# Patient Record
Sex: FEMALE | Race: BLACK OR AFRICAN AMERICAN | NOT HISPANIC OR LATINO | Employment: FULL TIME | URBAN - METROPOLITAN AREA
[De-identification: names, ages, dates, MRNs, and addresses within clinical notes are randomized per-mention and may not be internally consistent; named-entity substitution may affect disease eponyms.]

---

## 2018-02-19 ENCOUNTER — HOSPITAL ENCOUNTER (EMERGENCY)
Facility: HOSPITAL | Age: 33
Discharge: HOME | End: 2018-02-19
Attending: EMERGENCY MEDICINE
Payer: MEDICAID

## 2018-02-19 VITALS
HEIGHT: 65 IN | WEIGHT: 110 LBS | SYSTOLIC BLOOD PRESSURE: 128 MMHG | OXYGEN SATURATION: 100 % | HEART RATE: 94 BPM | TEMPERATURE: 99 F | BODY MASS INDEX: 18.33 KG/M2 | DIASTOLIC BLOOD PRESSURE: 69 MMHG | RESPIRATION RATE: 14 BRPM

## 2018-02-19 DIAGNOSIS — R11.10 VOMITING, INTRACTABILITY OF VOMITING NOT SPECIFIED, PRESENCE OF NAUSEA NOT SPECIFIED, UNSPECIFIED VOMITING TYPE: Primary | ICD-10-CM

## 2018-02-19 DIAGNOSIS — K29.00 ACUTE GASTRITIS, PRESENCE OF BLEEDING UNSPECIFIED, UNSPECIFIED GASTRITIS TYPE: ICD-10-CM

## 2018-02-19 DIAGNOSIS — J06.9 VIRAL UPPER RESPIRATORY INFECTION: ICD-10-CM

## 2018-02-19 PROCEDURE — 99283 EMERGENCY DEPT VISIT LOW MDM: CPT

## 2018-02-19 RX ORDER — ONDANSETRON 4 MG/1
4 TABLET, FILM COATED ORAL EVERY 6 HOURS
Qty: 12 TABLET | Refills: 0 | Status: SHIPPED | OUTPATIENT
Start: 2018-02-19 | End: 2019-05-06

## 2018-02-20 NOTE — ED TRIAGE NOTES
Pt presents to the ED c/o n/v. PCP dx with sinus infection on Friday and given prednisone, ibuprofen, doxycycline, zyrtec. Pt reports symptoms worsening. +weakness. Denies fever, chills, sore throat, body aches.

## 2018-02-20 NOTE — ED PROVIDER NOTES
Encounter Date: 2/19/2018    SCRIBE #1 NOTE: I, Monserrat Carcamo, am scribing for, and in the presence of,  Dr. Salazar. I have scribed the entire note.       History     Chief Complaint   Patient presents with    Nausea     Pt dx'ed with sinus infection Friday, says her symptoms of n/v and dizziness are getting worse. Emesis x 2 today.      Time patient was seen by the provider: 8:35 PM      The patient is a 32 y.o. female with no pertinent medical history who presents to the ED with a complaint of nausea. Patient reports her PCP diagnosed her with a sinus infection 3 days ago and prescribed her with steroids and antibiotics with no relief. She states her symptoms have worsened. Patient endorses cough, postnasal drip, nausea, vomiting, weakness, and dizziness. She denies alcohol and tobacco history.       The history is provided by the patient and medical records.     Review of patient's allergies indicates:  No Known Allergies  History reviewed. No pertinent past medical history.  Past Surgical History:   Procedure Laterality Date    CERVICAL BIOPSY  W/ LOOP ELECTRODE EXCISION      DILATION AND CURETTAGE OF UTERUS       History reviewed. No pertinent family history.  Social History   Substance Use Topics    Smoking status: Never Smoker    Smokeless tobacco: Not on file    Alcohol use No     Review of Systems   Constitutional: Negative for fever.   HENT: Positive for postnasal drip. Negative for sore throat.    Respiratory: Positive for cough. Negative for shortness of breath.    Cardiovascular: Negative for chest pain.   Gastrointestinal: Positive for nausea and vomiting.   Genitourinary: Negative for dysuria.   Musculoskeletal: Negative for back pain.   Skin: Negative for rash.   Neurological: Positive for dizziness and weakness.   Hematological: Does not bruise/bleed easily.       Physical Exam     Initial Vitals [02/19/18 2026]   BP Pulse Resp Temp SpO2   128/69 94 14 98.6 °F (37 °C) 100 %      MAP        88.67         Physical Exam    Nursing note and vitals reviewed.  Constitutional: She appears well-developed and well-nourished. No distress.   HENT:   Head: Normocephalic and atraumatic.   Right Ear: Tympanic membrane normal.   Eyes: Conjunctivae are normal.   Neck: Normal range of motion. Neck supple.   Cardiovascular: Normal rate, regular rhythm and normal heart sounds.   Pulmonary/Chest: Breath sounds normal. No respiratory distress.   Abdominal: Soft. Bowel sounds are normal. She exhibits no distension. There is no tenderness.   Musculoskeletal: Normal range of motion. She exhibits no edema.   Neurological: She is alert and oriented to person, place, and time. She has normal strength. No sensory deficit.   Skin: Skin is warm and dry. Capillary refill takes less than 2 seconds.   Psychiatric: She has a normal mood and affect.         ED Course   Procedures  Labs Reviewed - No data to display          Medical Decision Making:   History:   Old Medical Records: I decided to obtain old medical records.  ED Management:  Viral syndrome, flu. Vomiting due to doxy/gastritis. Suspect latter with absence of flu or other uri sx.             Scribe Attestation:   Scribe #1: I performed the above scribed service and the documentation accurately describes the services I performed. I attest to the accuracy of the note.    Attending Attestation:   Physician Attestation Statement for Resident:  As the supervising MD  -: I, Dr. Watson Salazar, personally performed the services described in this documentation. All medical record entries made by the scribe were at my direction and in my presence.  I have reviewed the chart and agree that the record reflects my personal performance and is accurate and complete. Watson Salazar MD.  8:49 PM 02/19/2018                         Clinical Impression:   The primary encounter diagnosis was Vomiting, intractability of vomiting not specified, presence of nausea not specified,  unspecified vomiting type. Diagnoses of Acute gastritis, presence of bleeding unspecified, unspecified gastritis type and Viral upper respiratory infection were also pertinent to this visit.                           Watson Salazar MD  02/19/18 2047

## 2019-05-06 ENCOUNTER — OFFICE VISIT (OUTPATIENT)
Dept: URGENT CARE | Facility: CLINIC | Age: 34
End: 2019-05-06
Payer: MEDICAID

## 2019-05-06 VITALS
OXYGEN SATURATION: 95 % | HEIGHT: 65 IN | DIASTOLIC BLOOD PRESSURE: 67 MMHG | RESPIRATION RATE: 16 BRPM | TEMPERATURE: 99 F | WEIGHT: 110 LBS | SYSTOLIC BLOOD PRESSURE: 98 MMHG | HEART RATE: 77 BPM | BODY MASS INDEX: 18.33 KG/M2

## 2019-05-06 DIAGNOSIS — J03.90 TONSILLITIS: Primary | ICD-10-CM

## 2019-05-06 DIAGNOSIS — J35.8 TONSIL STONE: ICD-10-CM

## 2019-05-06 PROCEDURE — 99203 OFFICE O/P NEW LOW 30 MIN: CPT | Mod: S$GLB,,, | Performed by: PHYSICIAN ASSISTANT

## 2019-05-06 PROCEDURE — 99203 PR OFFICE/OUTPT VISIT, NEW, LEVL III, 30-44 MIN: ICD-10-PCS | Mod: S$GLB,,, | Performed by: PHYSICIAN ASSISTANT

## 2019-05-06 RX ORDER — AMOXICILLIN 875 MG/1
875 TABLET, FILM COATED ORAL 2 TIMES DAILY
Qty: 20 TABLET | Refills: 0 | Status: SHIPPED | OUTPATIENT
Start: 2019-05-06 | End: 2019-05-16

## 2019-05-07 NOTE — PROGRESS NOTES
"Subjective:       Patient ID: Joaquín Encinas is a 34 y.o. female.    Vitals:  height is 5' 5" (1.651 m) and weight is 49.9 kg (110 lb). Her temperature is 98.6 °F (37 °C). Her blood pressure is 98/67 and her pulse is 77. Her respiration is 16 and oxygen saturation is 95%.     Chief Complaint: Sore Throat    Sore Throat    This is a recurrent problem. The current episode started 1 to 4 weeks ago. The problem has been gradually worsening. The pain is worse on the right side. There has been no fever. The pain is at a severity of 6/10. The pain is moderate. Pertinent negatives include no congestion, coughing, ear pain, shortness of breath, stridor or vomiting. She has had exposure to strep. She has tried nothing for the symptoms.       Constitution: Negative for chills, sweating, fatigue and fever.   HENT: Positive for sore throat. Negative for ear pain, congestion, sinus pain, sinus pressure and voice change.    Neck: Positive for painful lymph nodes and neck swelling.   Eyes: Negative for eye redness.   Respiratory: Negative for chest tightness, cough, sputum production, bloody sputum, COPD, shortness of breath, stridor, wheezing and asthma.    Gastrointestinal: Negative for nausea and vomiting.   Musculoskeletal: Negative for muscle ache.   Skin: Negative for rash.   Allergic/Immunologic: Positive for seasonal allergies. Negative for asthma.   Hematologic/Lymphatic: Positive for swollen lymph nodes.       Objective:      Physical Exam   Constitutional: She is oriented to person, place, and time. She appears well-developed and well-nourished. She is cooperative.  Non-toxic appearance. She does not appear ill. No distress.   HENT:   Head: Normocephalic and atraumatic.   Right Ear: Hearing, tympanic membrane, external ear and ear canal normal.   Left Ear: Hearing, tympanic membrane, external ear and ear canal normal.   Nose: Nose normal. No mucosal edema, rhinorrhea or nasal deformity. No epistaxis. Right sinus " exhibits no maxillary sinus tenderness and no frontal sinus tenderness. Left sinus exhibits no maxillary sinus tenderness and no frontal sinus tenderness.   Mouth/Throat: Uvula is midline and mucous membranes are normal. No trismus in the jaw. Normal dentition. No uvula swelling. Posterior oropharyngeal erythema present. Tonsils are 2+ on the right. Tonsils are 2+ on the left.       No temporal TTP   Eyes: Pupils are equal, round, and reactive to light. Conjunctivae, EOM and lids are normal. No scleral icterus.   Sclera clear bilat   Neck: Trachea normal, normal range of motion, full passive range of motion without pain and phonation normal. Neck supple. No neck rigidity.   Cardiovascular: Normal rate, regular rhythm, normal heart sounds, intact distal pulses and normal pulses.   Pulmonary/Chest: Effort normal and breath sounds normal. No respiratory distress.   Abdominal: Soft. Normal appearance and bowel sounds are normal. She exhibits no distension. There is no tenderness.   Musculoskeletal: Normal range of motion. She exhibits no edema or deformity.   Lymphadenopathy:        Head (right side): Tonsillar adenopathy present. No submental, no preauricular, no posterior auricular and no occipital adenopathy present.        Head (left side): Tonsillar adenopathy present. No submental, no preauricular, no posterior auricular and no occipital adenopathy present.     She has cervical adenopathy.        Right cervical: Superficial cervical adenopathy present.        Left cervical: Superficial cervical adenopathy present.   Neurological: She is alert and oriented to person, place, and time. No cranial nerve deficit. She exhibits normal muscle tone. Coordination normal.   Skin: Skin is warm, dry and intact. She is not diaphoretic. No pallor.   Psychiatric: She has a normal mood and affect. Her speech is normal and behavior is normal. Judgment and thought content normal. Cognition and memory are normal.   Nursing note and  vitals reviewed.      Assessment:       1. Tonsillitis    2. Tonsil stone        Plan:         Tonsillitis  -     amoxicillin (AMOXIL) 875 MG tablet; Take 1 tablet (875 mg total) by mouth 2 (two) times daily. for 10 days  Dispense: 20 tablet; Refill: 0    Tonsil stone          Self-Care for Sore Throats    Sore throats happen for many reasons, such as colds, allergies, and infections caused by viruses or bacteria. In any case, your throat becomes red and sore. Your goal for self-care is to reduce your discomfort while giving your throat a chance to heal.  Moisten and soothe your throat  Tips include the following:  · Try a sip of water first thing after waking up.  · Keep your throat moist by drinking 6 or more glasses of clear liquids every day.  · Run a cool-air humidifier in your room overnight.  · Avoid cigarette smoke.   · Suck on throat lozenges, cough drops, hard candy, ice chips, or frozen fruit-juice bars. Use the sugar-free versions if your diet or medical condition requires them.  Gargle to ease irritation  Gargling every hour or 2 can ease irritation. Try gargling with 1 of these solutions:  · 1/4 teaspoon of salt in 1/2 cup of warm water  · An over-the-counter anesthetic gargle  Use medicine for more relief  Over-the-counter medicine can reduce sore throat symptoms. Ask your pharmacist if you have questions about which medicine to use:  · Ease pain with anesthetic sprays. Aspirin or an aspirin substitute also helps. Remember, never give aspirin to anyone 18 or younger, or if you are already taking blood thinners.   · For sore throats caused by allergies, try antihistamines to block the allergic reaction.  · Remember: unless a sore throat is caused by a bacterial infection, antibiotics wont help you.  Prevent future sore throats  Prevention tips include the following:  · Stop smoking or reduce contact with secondhand smoke. Smoke irritates the tender throat lining.  · Limit contact with pets and with  allergy-causing substances, such as pollen and mold.  · When youre around someone with a sore throat or cold, wash your hands often to keep viruses or bacteria from spreading.  · Dont strain your vocal cords.  Call your healthcare provider  Contact your healthcare provider if you have:  · A temperature over 101°F (38.3°C)  · White spots on the throat  · Great difficulty swallowing  · Trouble breathing  · A skin rash  · Recent exposure to someone else with strep bacteria  · Severe hoarseness and swollen glands in the neck or jaw   Date Last Reviewed: 8/1/2016  © 3974-5443 [employer]. [address] [place] [address]. All rights reserved. This information is not intended as a substitute for professional medical care. Always follow your healthcare professional's instructions.      Please follow up with your Primary care provider within 2-5 days if your signs and symptoms have not resolved or worsen.     If your condition worsens or fails to improve we recommend that you receive another evaluation at the emergency room immediately or contact your primary medical clinic to discuss your concerns.   You must understand that you have received an Urgent Care treatment only and that you may be released before all of your medical problems are known or treated. You, the patient, will arrange for follow up care as instructed.     RED FLAGS/WARNING SYMPTOMS DISCUSSED WITH PATIENT THAT WOULD WARRANT EMERGENT MEDICAL ATTENTION. PATIENT VERBALIZED UNDERSTANDING.

## 2019-05-07 NOTE — PATIENT INSTRUCTIONS
Self-Care for Sore Throats    Sore throats happen for many reasons, such as colds, allergies, and infections caused by viruses or bacteria. In any case, your throat becomes red and sore. Your goal for self-care is to reduce your discomfort while giving your throat a chance to heal.  Moisten and soothe your throat  Tips include the following:  · Try a sip of water first thing after waking up.  · Keep your throat moist by drinking 6 or more glasses of clear liquids every day.  · Run a cool-air humidifier in your room overnight.  · Avoid cigarette smoke.   · Suck on throat lozenges, cough drops, hard candy, ice chips, or frozen fruit-juice bars. Use the sugar-free versions if your diet or medical condition requires them.  Gargle to ease irritation  Gargling every hour or 2 can ease irritation. Try gargling with 1 of these solutions:  · 1/4 teaspoon of salt in 1/2 cup of warm water  · An over-the-counter anesthetic gargle  Use medicine for more relief  Over-the-counter medicine can reduce sore throat symptoms. Ask your pharmacist if you have questions about which medicine to use:  · Ease pain with anesthetic sprays. Aspirin or an aspirin substitute also helps. Remember, never give aspirin to anyone 18 or younger, or if you are already taking blood thinners.   · For sore throats caused by allergies, try antihistamines to block the allergic reaction.  · Remember: unless a sore throat is caused by a bacterial infection, antibiotics wont help you.  Prevent future sore throats  Prevention tips include the following:  · Stop smoking or reduce contact with secondhand smoke. Smoke irritates the tender throat lining.  · Limit contact with pets and with allergy-causing substances, such as pollen and mold.  · When youre around someone with a sore throat or cold, wash your hands often to keep viruses or bacteria from spreading.  · Dont strain your vocal cords.  Call your healthcare provider  Contact your healthcare provider if  you have:  · A temperature over 101°F (38.3°C)  · White spots on the throat  · Great difficulty swallowing  · Trouble breathing  · A skin rash  · Recent exposure to someone else with strep bacteria  · Severe hoarseness and swollen glands in the neck or jaw   Date Last Reviewed: 8/1/2016  © 3945-1669 [employer]. [address] [place] [address]. All rights reserved. This information is not intended as a substitute for professional medical care. Always follow your healthcare professional's instructions.      Please follow up with your Primary care provider within 2-5 days if your signs and symptoms have not resolved or worsen.     If your condition worsens or fails to improve we recommend that you receive another evaluation at the emergency room immediately or contact your primary medical clinic to discuss your concerns.   You must understand that you have received an Urgent Care treatment only and that you may be released before all of your medical problems are known or treated. You, the patient, will arrange for follow up care as instructed.     RED FLAGS/WARNING SYMPTOMS DISCUSSED WITH PATIENT THAT WOULD WARRANT EMERGENT MEDICAL ATTENTION. PATIENT VERBALIZED UNDERSTANDING.

## 2019-12-12 ENCOUNTER — OFFICE VISIT (OUTPATIENT)
Dept: URGENT CARE | Facility: CLINIC | Age: 34
End: 2019-12-12
Payer: MEDICAID

## 2019-12-12 VITALS
WEIGHT: 110 LBS | HEIGHT: 65 IN | OXYGEN SATURATION: 100 % | SYSTOLIC BLOOD PRESSURE: 101 MMHG | DIASTOLIC BLOOD PRESSURE: 68 MMHG | BODY MASS INDEX: 18.33 KG/M2 | HEART RATE: 73 BPM | TEMPERATURE: 98 F | RESPIRATION RATE: 19 BRPM

## 2019-12-12 DIAGNOSIS — J02.9 SORE THROAT: ICD-10-CM

## 2019-12-12 DIAGNOSIS — J02.8 BACTERIAL PHARYNGITIS: Primary | ICD-10-CM

## 2019-12-12 DIAGNOSIS — B96.89 BACTERIAL PHARYNGITIS: Primary | ICD-10-CM

## 2019-12-12 LAB
CTP QC/QA: YES
S PYO RRNA THROAT QL PROBE: NEGATIVE

## 2019-12-12 PROCEDURE — 99214 PR OFFICE/OUTPT VISIT, EST, LEVL IV, 30-39 MIN: ICD-10-PCS | Mod: S$GLB,,, | Performed by: PHYSICIAN ASSISTANT

## 2019-12-12 PROCEDURE — 99214 OFFICE O/P EST MOD 30 MIN: CPT | Mod: S$GLB,,, | Performed by: PHYSICIAN ASSISTANT

## 2019-12-12 PROCEDURE — 87880 POCT RAPID STREP A: ICD-10-PCS | Mod: QW,S$GLB,, | Performed by: PHYSICIAN ASSISTANT

## 2019-12-12 PROCEDURE — 87880 STREP A ASSAY W/OPTIC: CPT | Mod: QW,S$GLB,, | Performed by: PHYSICIAN ASSISTANT

## 2019-12-12 RX ORDER — AMOXICILLIN 875 MG/1
875 TABLET, FILM COATED ORAL 2 TIMES DAILY
Qty: 20 TABLET | Refills: 0 | Status: SHIPPED | OUTPATIENT
Start: 2019-12-12 | End: 2019-12-22

## 2019-12-12 RX ORDER — PREDNISONE 10 MG/1
10 TABLET ORAL DAILY
Qty: 3 TABLET | Refills: 0 | Status: SHIPPED | OUTPATIENT
Start: 2019-12-12 | End: 2019-12-15

## 2019-12-12 NOTE — PROGRESS NOTES
"Subjective:       Patient ID: Joaquín Encinas is a 34 y.o. female.    Vitals:  height is 5' 5" (1.651 m) and weight is 49.9 kg (110 lb). Her oral temperature is 98 °F (36.7 °C). Her blood pressure is 101/68 and her pulse is 73. Her respiration is 19 and oxygen saturation is 100%.     Chief Complaint: Sore Throat    Sore Throat    This is a new problem. The current episode started yesterday. The problem has been gradually worsening. There has been no fever. The pain is mild. Associated symptoms include a hoarse voice and trouble swallowing. Pertinent negatives include no abdominal pain, congestion, coughing, diarrhea, drooling, ear discharge, ear pain, headaches, plugged ear sensation, neck pain, shortness of breath, stridor, swollen glands or vomiting. Treatments tried: Tea. The treatment provided mild relief.       Constitution: Negative for chills, fatigue and fever.   HENT: Positive for sore throat and trouble swallowing. Negative for ear pain, ear discharge, drooling and congestion.    Neck: Negative for neck pain and painful lymph nodes.   Cardiovascular: Negative for chest pain and leg swelling.   Eyes: Negative for double vision and blurred vision.   Respiratory: Negative for cough, shortness of breath and stridor.    Gastrointestinal: Negative for abdominal pain, nausea, vomiting and diarrhea.   Genitourinary: Negative for dysuria, frequency, urgency and history of kidney stones.   Musculoskeletal: Negative for joint pain, joint swelling, muscle cramps and muscle ache.   Skin: Negative for color change, pale, rash and bruising.   Allergic/Immunologic: Negative for seasonal allergies.   Neurological: Negative for dizziness, history of vertigo, light-headedness, passing out and headaches.   Hematologic/Lymphatic: Negative for swollen lymph nodes.   Psychiatric/Behavioral: Negative for nervous/anxious, sleep disturbance and depression. The patient is not nervous/anxious.        Objective:      Physical Exam "   Constitutional: She is oriented to person, place, and time. She appears well-developed and well-nourished. She is cooperative.  Non-toxic appearance. She does not have a sickly appearance. She does not appear ill. No distress.   HENT:   Head: Normocephalic and atraumatic.   Right Ear: Hearing, tympanic membrane, external ear and ear canal normal.   Left Ear: Hearing, tympanic membrane, external ear and ear canal normal.   Nose: Nose normal. No mucosal edema, rhinorrhea or nasal deformity. No epistaxis. Right sinus exhibits no maxillary sinus tenderness and no frontal sinus tenderness. Left sinus exhibits no maxillary sinus tenderness and no frontal sinus tenderness.   Mouth/Throat: Uvula is midline and mucous membranes are normal. No trismus in the jaw. Normal dentition. No uvula swelling. Posterior oropharyngeal erythema present. No oropharyngeal exudate, posterior oropharyngeal edema, tonsillar abscesses or cobblestoning. Tonsils are 2+ on the right. Tonsils are 2+ on the left. No tonsillar exudate.   Eyes: Conjunctivae and lids are normal. No scleral icterus.   Neck: Trachea normal, full passive range of motion without pain and phonation normal. Neck supple. No neck rigidity. No edema and no erythema present.   Cardiovascular: Normal rate, regular rhythm, normal heart sounds, intact distal pulses and normal pulses.   Pulmonary/Chest: Effort normal and breath sounds normal. No respiratory distress. She has no decreased breath sounds. She has no rhonchi.   Abdominal: Normal appearance.   Musculoskeletal: Normal range of motion. She exhibits no edema or deformity.   Lymphadenopathy:        Head (right side): No submental, no submandibular, no preauricular and no posterior auricular adenopathy present.        Head (left side): No submental, no submandibular, no preauricular and no posterior auricular adenopathy present.     She has cervical adenopathy (tender).        Right cervical: Superficial cervical adenopathy  present.        Left cervical: Superficial cervical adenopathy present.   Neurological: She is alert and oriented to person, place, and time. She exhibits normal muscle tone. Coordination normal.   Skin: Skin is warm, dry, intact, not diaphoretic and not pale.   Psychiatric: She has a normal mood and affect. Her speech is normal and behavior is normal. Judgment and thought content normal. Cognition and memory are normal.   Nursing note and vitals reviewed.        Assessment:       1. Bacterial pharyngitis    2. Sore throat        Plan:         Bacterial pharyngitis  -     predniSONE (DELTASONE) 10 MG tablet; Take 1 tablet (10 mg total) by mouth once daily. for 3 days  Dispense: 3 tablet; Refill: 0  -     amoxicillin (AMOXIL) 875 MG tablet; Take 1 tablet (875 mg total) by mouth 2 (two) times daily. for 10 days  Dispense: 20 tablet; Refill: 0    Sore throat  -     POCT rapid strep A          Self-Care for Sore Throats    Sore throats happen for many reasons, such as colds, allergies, and infections caused by viruses or bacteria. In any case, your throat becomes red and sore. Your goal for self-care is to reduce your discomfort while giving your throat a chance to heal.  Moisten and soothe your throat  Tips include the following:  · Try a sip of water first thing after waking up.  · Keep your throat moist by drinking 6 or more glasses of clear liquids every day.  · Run a cool-air humidifier in your room overnight.  · Avoid cigarette smoke.   · Suck on throat lozenges, cough drops, hard candy, ice chips, or frozen fruit-juice bars. Use the sugar-free versions if your diet or medical condition requires them.  Gargle to ease irritation  Gargling every hour or 2 can ease irritation. Try gargling with 1 of these solutions:  · 1/4 teaspoon of salt in 1/2 cup of warm water  · An over-the-counter anesthetic gargle  Use medicine for more relief  Over-the-counter medicine can reduce sore throat symptoms. Ask your pharmacist if  you have questions about which medicine to use:  · Ease pain with anesthetic sprays. Aspirin or an aspirin substitute also helps. Remember, never give aspirin to anyone 18 or younger, or if you are already taking blood thinners.   · For sore throats caused by allergies, try antihistamines to block the allergic reaction.  · Remember: unless a sore throat is caused by a bacterial infection, antibiotics wont help you.  Prevent future sore throats  Prevention tips include the following:  · Stop smoking or reduce contact with secondhand smoke. Smoke irritates the tender throat lining.  · Limit contact with pets and with allergy-causing substances, such as pollen and mold.  · When youre around someone with a sore throat or cold, wash your hands often to keep viruses or bacteria from spreading.  · Dont strain your vocal cords.  Call your healthcare provider  Contact your healthcare provider if you have:  · A temperature over 101°F (38.3°C)  · White spots on the throat  · Great difficulty swallowing  · Trouble breathing  · A skin rash  · Recent exposure to someone else with strep bacteria  · Severe hoarseness and swollen glands in the neck or jaw   Date Last Reviewed: 8/1/2016  © 5117-7350 [employer]. [address] [place] [address]. All rights reserved. This information is not intended as a substitute for professional medical care. Always follow your healthcare professional's instructions.      YOU HAVE BEEN GIVEN A PRESCRIPTION FOR ANTIBIOTICS. IT WAS ADVISED TO DELAY THE COURSE OF ANTIBIOTICS FOR 2-3 DAYS IF SYMPTOMS DO NOT RESOLVE. IT IMPORTANT TO FOLLOW THESE INSTRUCTIONS AS ANTIBIOTIC RESISTANCE IS HIGH. YOUR SYMPTOMS WILL LIKELY RESOLVE WITHOUT THIS PRESCRIPTIONS.    Please follow up with your Primary care provider within 2-5 days if your signs and symptoms have not resolved or worsen.     If your condition worsens or fails to improve we recommend that you receive another evaluation at the  emergency room immediately or contact your primary medical clinic to discuss your concerns.   You must understand that you have received an Urgent Care treatment only and that you may be released before all of your medical problems are known or treated. You, the patient, will arrange for follow up care as instructed.     RED FLAGS/WARNING SYMPTOMS DISCUSSED WITH PATIENT THAT WOULD WARRANT EMERGENT MEDICAL ATTENTION. PATIENT VERBALIZED UNDERSTANDING.

## 2019-12-12 NOTE — PATIENT INSTRUCTIONS
Self-Care for Sore Throats    Sore throats happen for many reasons, such as colds, allergies, and infections caused by viruses or bacteria. In any case, your throat becomes red and sore. Your goal for self-care is to reduce your discomfort while giving your throat a chance to heal.  Moisten and soothe your throat  Tips include the following:  · Try a sip of water first thing after waking up.  · Keep your throat moist by drinking 6 or more glasses of clear liquids every day.  · Run a cool-air humidifier in your room overnight.  · Avoid cigarette smoke.   · Suck on throat lozenges, cough drops, hard candy, ice chips, or frozen fruit-juice bars. Use the sugar-free versions if your diet or medical condition requires them.  Gargle to ease irritation  Gargling every hour or 2 can ease irritation. Try gargling with 1 of these solutions:  · 1/4 teaspoon of salt in 1/2 cup of warm water  · An over-the-counter anesthetic gargle  Use medicine for more relief  Over-the-counter medicine can reduce sore throat symptoms. Ask your pharmacist if you have questions about which medicine to use:  · Ease pain with anesthetic sprays. Aspirin or an aspirin substitute also helps. Remember, never give aspirin to anyone 18 or younger, or if you are already taking blood thinners.   · For sore throats caused by allergies, try antihistamines to block the allergic reaction.  · Remember: unless a sore throat is caused by a bacterial infection, antibiotics wont help you.  Prevent future sore throats  Prevention tips include the following:  · Stop smoking or reduce contact with secondhand smoke. Smoke irritates the tender throat lining.  · Limit contact with pets and with allergy-causing substances, such as pollen and mold.  · When youre around someone with a sore throat or cold, wash your hands often to keep viruses or bacteria from spreading.  · Dont strain your vocal cords.  Call your healthcare provider  Contact your healthcare provider if  you have:  · A temperature over 101°F (38.3°C)  · White spots on the throat  · Great difficulty swallowing  · Trouble breathing  · A skin rash  · Recent exposure to someone else with strep bacteria  · Severe hoarseness and swollen glands in the neck or jaw   Date Last Reviewed: 8/1/2016  © 1575-4087 [employer]. [address] [place] [address]. All rights reserved. This information is not intended as a substitute for professional medical care. Always follow your healthcare professional's instructions.      YOU HAVE BEEN GIVEN A PRESCRIPTION FOR ANTIBIOTICS. IT WAS ADVISED TO DELAY THE COURSE OF ANTIBIOTICS FOR 2-3 DAYS IF SYMPTOMS DO NOT RESOLVE. IT IMPORTANT TO FOLLOW THESE INSTRUCTIONS AS ANTIBIOTIC RESISTANCE IS HIGH. YOUR SYMPTOMS WILL LIKELY RESOLVE WITHOUT THIS PRESCRIPTIONS.    Please follow up with your Primary care provider within 2-5 days if your signs and symptoms have not resolved or worsen.     If your condition worsens or fails to improve we recommend that you receive another evaluation at the emergency room immediately or contact your primary medical clinic to discuss your concerns.   You must understand that you have received an Urgent Care treatment only and that you may be released before all of your medical problems are known or treated. You, the patient, will arrange for follow up care as instructed.     RED FLAGS/WARNING SYMPTOMS DISCUSSED WITH PATIENT THAT WOULD WARRANT EMERGENT MEDICAL ATTENTION. PATIENT VERBALIZED UNDERSTANDING.

## 2020-08-28 ENCOUNTER — TELEPHONE (OUTPATIENT)
Dept: RHEUMATOLOGY | Facility: CLINIC | Age: 35
End: 2020-08-28

## 2020-08-31 NOTE — TELEPHONE ENCOUNTER
Contacted patient and informed of first available for both providers. Patient currently lives in [place]. Nurse provided phone number to [facility]. Patient will call to schedule.

## 2020-08-31 NOTE — TELEPHONE ENCOUNTER
Patient contacted office and scheduled with Dr Moses and added to wait list. Patient aware of date and time pf appointment.

## 2020-10-21 ENCOUNTER — OFFICE VISIT (OUTPATIENT)
Dept: URGENT CARE | Facility: CLINIC | Age: 35
End: 2020-10-21
Payer: MEDICAID

## 2020-10-21 VITALS
DIASTOLIC BLOOD PRESSURE: 67 MMHG | HEIGHT: 65 IN | TEMPERATURE: 99 F | OXYGEN SATURATION: 99 % | RESPIRATION RATE: 18 BRPM | HEART RATE: 97 BPM | BODY MASS INDEX: 18.33 KG/M2 | WEIGHT: 110 LBS | SYSTOLIC BLOOD PRESSURE: 105 MMHG

## 2020-10-21 DIAGNOSIS — H60.399 BACTERIAL OTITIS EXTERNA: Primary | ICD-10-CM

## 2020-10-21 PROCEDURE — 99214 OFFICE O/P EST MOD 30 MIN: CPT | Mod: S$GLB,,, | Performed by: PHYSICIAN ASSISTANT

## 2020-10-21 PROCEDURE — 99214 PR OFFICE/OUTPT VISIT, EST, LEVL IV, 30-39 MIN: ICD-10-PCS | Mod: S$GLB,,, | Performed by: PHYSICIAN ASSISTANT

## 2020-10-21 RX ORDER — NEOMYCIN SULFATE, POLYMYXIN B SULFATE AND HYDROCORTISONE 10; 3.5; 1 MG/ML; MG/ML; [USP'U]/ML
3 SUSPENSION/ DROPS AURICULAR (OTIC) 3 TIMES DAILY
Qty: 10 ML | Refills: 0 | Status: SHIPPED | OUTPATIENT
Start: 2020-10-21 | End: 2020-10-28

## 2020-10-21 RX ORDER — OFLOXACIN 3 MG/ML
5 SOLUTION AURICULAR (OTIC) DAILY
Qty: 5 ML | Refills: 0 | Status: SHIPPED | OUTPATIENT
Start: 2020-10-21 | End: 2020-10-31

## 2020-10-21 NOTE — PATIENT INSTRUCTIONS
Apply antibiotic drops as prescribed to completion.    Please follow up with your primary care provider within 2-5 days if your signs and symptoms have not resolved or worsen.     If your condition worsens or fails to improve we recommend that you receive another evaluation at the emergency room immediately or contact your primary medical clinic to discuss your concerns.   You must understand that you have received an Urgent Care treatment only and that you may be released before all of your medical problems are known or treated. You, the patient, will arrange for follow up care as instructed.         External Ear Infection (Adult)    External otitis (also called swimmers ear) is an infection in the ear canal. It is often caused by bacteria or fungus. It can occur a few days after water gets trapped in the ear canal (from swimming or bathing). It can also occur after cleaning too deeply in the ear canal with a cotton swab or other object. Sometimes, hair care products get into the ear canal and cause this problem.  Symptoms can include pain, fever, itching, redness, drainage, or swelling of the ear canal. Temporary hearing loss may also occur.  Home care  · Do not try to clean the ear canal. This can push pus and bacteria deeper into the canal.  · Use prescribed ear drops as directed. These help reduce swelling and fight the infection. If an ear wick was placed in the ear canal, apply drops right onto the end of the wick. The wick will draw the medication into the ear canal even if it is swollen closed.  · A cotton ball may be loosely placed in the outer ear to absorb any drainage.  · You may use acetaminophen or ibuprofen to control pain, unless another medication was prescribed. Note: If you have chronic liver or kidney disease or ever had a stomach ulcer or GI bleeding, talk to your health care provider before taking any of these medications.  · Do not allow water to get into your ear when bathing. Also, avoid  swimming until the infection has cleared.  Prevention  · Keep your ears dry. This helps lower the risk of infection. Dry your ears with a towel or hair dryer after getting wet. Also, use ear plugs when swimming.  · Do not stick any objects in the ear to remove wax.  · If you feel water trapped in your ear, use ear drops right away. You can get these drops over the counter at most drugstores. They work by removing water from the ear canal.  Follow-up care  Follow up with your health care provider in one week, or as advised.  When to seek medical advice  Call your health care provider right away if any of these occur:  · Ear pain becomes worse or doesnt improve after 3 days of treatment  · Redness or swelling of the outer ear occurs or gets worse  · Headache  · Painful or stiff neck  · Drowsiness or confusion  · Fever of 100.4ºF (38ºC) or higher, or as directed by your health care provider  · Seizure  Date Last Reviewed: 3/22/2015  © 7758-0173 [employer]. [address] [place] [address], [place] [address]. All rights reserved. This information is not intended as a substitute for professional medical care. Always follow your healthcare professional's instructions.

## 2020-10-21 NOTE — PROGRESS NOTES
"Subjective:       Patient ID: Joaquín Encinas is a 35 y.o. female.    Vitals:  height is 5' 5" (1.651 m) and weight is 49.9 kg (110 lb). Her temperature is 98.5 °F (36.9 °C). Her blood pressure is 105/67 and her pulse is 97. Her respiration is 18 and oxygen saturation is 99%.     Chief Complaint: Otalgia    Patient reports right ear fullness that started Sunday night. She has tried cleaning the ear and ear drops that did not help.     Otalgia   There is pain in the right ear. This is a new problem. The current episode started in the past 7 days (Sunday). The problem occurs constantly. The problem has been gradually worsening. There has been no fever. The pain is at a severity of 3/10. The pain is mild. Associated symptoms include hearing loss (right ear feels "clogged"). Pertinent negatives include no abdominal pain, coughing, diarrhea, ear discharge, headaches, neck pain, rash, rhinorrhea, sore throat or vomiting. Treatments tried: debrox. The treatment provided no relief.       Constitution: Negative for chills, fatigue and fever.   HENT: Positive for ear pain and hearing loss (right ear feels "clogged"). Negative for ear discharge, foreign body in ear, tinnitus, congestion, sinus pain and sore throat.    Neck: Negative for neck pain, neck stiffness and neck swelling.   Cardiovascular: Negative for chest pain, palpitations and sob on exertion.   Eyes: Negative for eye discharge, eye itching and eye pain.   Respiratory: Negative for chest tightness, cough and shortness of breath.    Gastrointestinal: Negative for abdominal pain, nausea, vomiting and diarrhea.   Musculoskeletal: Negative for pain, muscle cramps and muscle ache.   Skin: Negative for rash.   Neurological: Negative for headaches.       Objective:      Physical Exam   Constitutional: She is oriented to person, place, and time. She appears well-developed. She is cooperative.  Non-toxic appearance. She does not appear ill. No distress.   HENT:   Head: " Normocephalic and atraumatic.   Ears:   Right Ear: Hearing, tympanic membrane, external ear and ear canal normal. There is swelling and tenderness.   Left Ear: Hearing, tympanic membrane, external ear and ear canal normal.   Nose: Nose normal. No mucosal edema or rhinorrhea. Right sinus exhibits no maxillary sinus tenderness and no frontal sinus tenderness. Left sinus exhibits no maxillary sinus tenderness and no frontal sinus tenderness.   Mouth/Throat: Oropharynx is clear and moist and mucous membranes are normal. No oropharyngeal exudate, posterior oropharyngeal edema or posterior oropharyngeal erythema.   Pain with movement of external right ear. Erythematous edematous right ear canal.      Comments: Pain with movement of external right ear. Erythematous edematous right ear canal.  Eyes: Conjunctivae, EOM and lids are normal. Right eye exhibits no discharge. Left eye exhibits no discharge. No scleral icterus.   Neck: Trachea normal, normal range of motion, full passive range of motion without pain and phonation normal. Neck supple.   Cardiovascular: Normal rate, regular rhythm and normal heart sounds. Exam reveals no gallop.   No murmur heard.  Pulmonary/Chest: Effort normal and breath sounds normal. No respiratory distress. She has no decreased breath sounds. She has no wheezes. She has no rhonchi. She has no rales.   Musculoskeletal:         General: No deformity.   Lymphadenopathy:        Head (right side): No submandibular and no tonsillar adenopathy present.        Head (left side): No submandibular and no tonsillar adenopathy present.   Neurological: She is alert and oriented to person, place, and time. Coordination normal.   Skin: Skin is warm, dry, intact, not diaphoretic and not pale. Psychiatric: Her speech is normal and behavior is normal. Judgment and thought content normal.   Nursing note and vitals reviewed.        Assessment:       1. Bacterial otitis externa        Plan:       Patient called and  states her insurance doesn't cover ofloxacin. Sent in cortisporin instead.    Bacterial otitis externa  -     ofloxacin (FLOXIN) 0.3 % otic solution; Place 5 drops into both ears once daily. for 10 days  Dispense: 5 mL; Refill: 0  -     neomycin-polymyxin-hydrocortisone (CORTISPORIN) 3.5-10,000-1 mg/mL-unit/mL-% otic suspension; Place 3 drops into the right ear 3 (three) times daily. for 7 days  Dispense: 10 mL; Refill: 0      Patient Instructions   Apply antibiotic drops as prescribed to completion.    Please follow up with your primary care provider within 2-5 days if your signs and symptoms have not resolved or worsen.     If your condition worsens or fails to improve we recommend that you receive another evaluation at the emergency room immediately or contact your primary medical clinic to discuss your concerns.   You must understand that you have received an Urgent Care treatment only and that you may be released before all of your medical problems are known or treated. You, the patient, will arrange for follow up care as instructed.         External Ear Infection (Adult)    External otitis (also called swimmers ear) is an infection in the ear canal. It is often caused by bacteria or fungus. It can occur a few days after water gets trapped in the ear canal (from swimming or bathing). It can also occur after cleaning too deeply in the ear canal with a cotton swab or other object. Sometimes, hair care products get into the ear canal and cause this problem.  Symptoms can include pain, fever, itching, redness, drainage, or swelling of the ear canal. Temporary hearing loss may also occur.  Home care  · Do not try to clean the ear canal. This can push pus and bacteria deeper into the canal.  · Use prescribed ear drops as directed. These help reduce swelling and fight the infection. If an ear wick was placed in the ear canal, apply drops right onto the end of the wick. The wick will draw the medication into the ear  canal even if it is swollen closed.  · A cotton ball may be loosely placed in the outer ear to absorb any drainage.  · You may use acetaminophen or ibuprofen to control pain, unless another medication was prescribed. Note: If you have chronic liver or kidney disease or ever had a stomach ulcer or GI bleeding, talk to your health care provider before taking any of these medications.  · Do not allow water to get into your ear when bathing. Also, avoid swimming until the infection has cleared.  Prevention  · Keep your ears dry. This helps lower the risk of infection. Dry your ears with a towel or hair dryer after getting wet. Also, use ear plugs when swimming.  · Do not stick any objects in the ear to remove wax.  · If you feel water trapped in your ear, use ear drops right away. You can get these drops over the counter at most drugstores. They work by removing water from the ear canal.  Follow-up care  Follow up with your health care provider in one week, or as advised.  When to seek medical advice  Call your health care provider right away if any of these occur:  · Ear pain becomes worse or doesnt improve after 3 days of treatment  · Redness or swelling of the outer ear occurs or gets worse  · Headache  · Painful or stiff neck  · Drowsiness or confusion  · Fever of 100.4ºF (38ºC) or higher, or as directed by your health care provider  · Seizure  Date Last Reviewed: 3/22/2015  © 6627-4182 [employer]. [address] [place] [address], [place] [address]. All rights reserved. This information is not intended as a substitute for professional medical care. Always follow your healthcare professional's instructions.

## 2021-03-15 ENCOUNTER — TELEPHONE (OUTPATIENT)
Dept: REHABILITATION | Facility: HOSPITAL | Age: 36
End: 2021-03-15

## 2021-04-16 ENCOUNTER — PATIENT MESSAGE (OUTPATIENT)
Dept: RESEARCH | Facility: HOSPITAL | Age: 36
End: 2021-04-16

## 2021-12-14 ENCOUNTER — TELEPHONE (OUTPATIENT)
Dept: RHEUMATOLOGY | Facility: CLINIC | Age: 36
End: 2021-12-14
Payer: MEDICAID

## 2022-02-03 NOTE — PROGRESS NOTES
"Subjective:       Patient ID: Joaquín Encinas is a 36 y.o. female.    Chief Complaint: Positive TRESA    HPI     This is a 36 year old woman with PMH of SALVADOR, endometriosis, ovarian cyst removal, s/p bunion correction (2/3/22), hyperthyroidism, and vitamin D deficiency who was referred to Rheum for a positive TRESA 1:80 nuclear and speckled. She was found to have an ovarian cyst at age 18. At that time, she was also found to have a positive TRESA and antiphospholipid antibodies. She does not have a history of miscarriages, pre-term birth, preeclampsia, placental insufficiency, or VTE. She was given baby aspirin during her pregnancy but otherwise did not require anticoagulation. Currently, she notes intermittent alopecia, occasional difficulty swallowing pills but not food or water, rare pleuritic chest pain, and rare left knee pain when the weather changes. She denies rashes, fevers, blurring of vision, sicca symptoms, photosensitivity, oral/nasal ulcers, shortness of breath/cough, abdominal pain, diarrhea, bloody stool, joint swelling, Raynaud's, and weakness.     Gyn History:    (1001), baby born at 37 weeks due to IUGR    Family History:   No autoimmune disease    Social History:  Never smoker  Denies alcoholic beverage intake and illicit drug use     Objective:   /70 (BP Location: Right arm, Patient Position: Sitting, BP Method: Large (Automatic))   Pulse 95   Resp 16   Ht 5' 5" (1.651 m)   Wt 54.9 kg (121 lb)   SpO2 98% Comment: on room air at rest  BMI 20.14 kg/m²      Physical Exam   HENT:   Head: Normocephalic and atraumatic.   Cardiovascular: Normal rate and regular rhythm.   Pulmonary/Chest: Effort normal and breath sounds normal.   Abdominal: Normal appearance.   Musculoskeletal:      Comments: Left foot in boot  No synovitis, dactylitis, enthesitis   Neurological: She is alert.   Skin: Skin is warm and dry. No rash noted.   No alopecia        No data to display     Labs and imaging reviewed by " me:  Positive TRESA 1:80 nuclear and speckled (8/2020)  CBC (8/2020): WNL   CMP WNL   ESR and CRP WNL    Assessment:       1. Positive TRESA (antinuclear antibody)        This is a 36 year old woman with PMH of SALVADOR, endometriosis, ovarian cyst removal, s/p bunion correction (2/3/22), hyperthyroidism, and vitamin D deficiency who was referred to Rheum for a positive TRESA 1:80 nuclear and speckled. She states that she was found to have + antiphospholipid antibodies in the past but does not have a history of miscarriages, pre-term birth, preeclampsia, placental insufficiency, or VTE. She states that she had anemia of unclear etiology for many years and was seen by [facility] for this, and advised to see a rheumatologist. Currently, she notes intermittent alopecia, occasional difficulty swallowing pills but not food or water, rare pleuritic chest pain, and rare left knee pain when the weather changes. Extensive rheum ROS is otherwise unremarkable. Work up outlined below.     Plan:       Problem List Items Addressed This Visit    None     Visit Diagnoses     Positive TRESA (antinuclear antibody)    -  Primary    Relevant Orders    Anti-DNA Ab, Double-Stranded    C3 Complement    C4 Complement    CBC Auto Differential    Comprehensive Metabolic Panel    C-Reactive Protein    Protein/Creatinine Ratio, Urine    Sedimentation rate    Urinalysis    DRVVT    Cardiolipin antibody    Beta-2 Glycoprotein Abs (IgA, IgG, IgM)    TRESA Screen w/Reflex    Complement, Total    Direct antiglobulin test    Reticulocytes    Haptoglobin        Follow up in 6 months, or sooner if with concerning labs    45 minutes of total time spent on the encounter, which includes face to face time and non-face to face time preparing to see the patient (eg, review of tests), Obtaining and/or reviewing separately obtained history, Documenting clinical information in the electronic or other health record, Independently interpreting results (not separately reported) and  communicating results to the patient/family/caregiver, or Care coordination (not separately reported).       Tamar Burrell M.D.  Rheumatology Dept  [place], [place]

## 2022-02-04 ENCOUNTER — OFFICE VISIT (OUTPATIENT)
Dept: RHEUMATOLOGY | Facility: CLINIC | Age: 37
End: 2022-02-04
Payer: MEDICAID

## 2022-02-04 VITALS
BODY MASS INDEX: 20.16 KG/M2 | DIASTOLIC BLOOD PRESSURE: 70 MMHG | HEIGHT: 65 IN | RESPIRATION RATE: 16 BRPM | HEART RATE: 95 BPM | OXYGEN SATURATION: 98 % | SYSTOLIC BLOOD PRESSURE: 120 MMHG | WEIGHT: 121 LBS

## 2022-02-04 DIAGNOSIS — R76.8 POSITIVE ANA (ANTINUCLEAR ANTIBODY): Primary | ICD-10-CM

## 2022-02-04 PROCEDURE — 3008F PR BODY MASS INDEX (BMI) DOCUMENTED: ICD-10-PCS | Mod: CPTII,,, | Performed by: STUDENT IN AN ORGANIZED HEALTH CARE EDUCATION/TRAINING PROGRAM

## 2022-02-04 PROCEDURE — 3078F PR MOST RECENT DIASTOLIC BLOOD PRESSURE < 80 MM HG: ICD-10-PCS | Mod: CPTII,,, | Performed by: STUDENT IN AN ORGANIZED HEALTH CARE EDUCATION/TRAINING PROGRAM

## 2022-02-04 PROCEDURE — 1159F PR MEDICATION LIST DOCUMENTED IN MEDICAL RECORD: ICD-10-PCS | Mod: CPTII,,, | Performed by: STUDENT IN AN ORGANIZED HEALTH CARE EDUCATION/TRAINING PROGRAM

## 2022-02-04 PROCEDURE — 3008F BODY MASS INDEX DOCD: CPT | Mod: CPTII,,, | Performed by: STUDENT IN AN ORGANIZED HEALTH CARE EDUCATION/TRAINING PROGRAM

## 2022-02-04 PROCEDURE — 99999 PR PBB SHADOW E&M-EST. PATIENT-LVL IV: CPT | Mod: PBBFAC,,, | Performed by: STUDENT IN AN ORGANIZED HEALTH CARE EDUCATION/TRAINING PROGRAM

## 2022-02-04 PROCEDURE — 99204 PR OFFICE/OUTPT VISIT, NEW, LEVL IV, 45-59 MIN: ICD-10-PCS | Mod: S$PBB,,, | Performed by: STUDENT IN AN ORGANIZED HEALTH CARE EDUCATION/TRAINING PROGRAM

## 2022-02-04 PROCEDURE — 1159F MED LIST DOCD IN RCRD: CPT | Mod: CPTII,,, | Performed by: STUDENT IN AN ORGANIZED HEALTH CARE EDUCATION/TRAINING PROGRAM

## 2022-02-04 PROCEDURE — 99999 PR PBB SHADOW E&M-EST. PATIENT-LVL IV: ICD-10-PCS | Mod: PBBFAC,,, | Performed by: STUDENT IN AN ORGANIZED HEALTH CARE EDUCATION/TRAINING PROGRAM

## 2022-02-04 PROCEDURE — 3074F SYST BP LT 130 MM HG: CPT | Mod: CPTII,,, | Performed by: STUDENT IN AN ORGANIZED HEALTH CARE EDUCATION/TRAINING PROGRAM

## 2022-02-04 PROCEDURE — 99204 OFFICE O/P NEW MOD 45 MIN: CPT | Mod: S$PBB,,, | Performed by: STUDENT IN AN ORGANIZED HEALTH CARE EDUCATION/TRAINING PROGRAM

## 2022-02-04 PROCEDURE — 99214 OFFICE O/P EST MOD 30 MIN: CPT | Mod: PBBFAC,PO | Performed by: STUDENT IN AN ORGANIZED HEALTH CARE EDUCATION/TRAINING PROGRAM

## 2022-02-04 PROCEDURE — 3078F DIAST BP <80 MM HG: CPT | Mod: CPTII,,, | Performed by: STUDENT IN AN ORGANIZED HEALTH CARE EDUCATION/TRAINING PROGRAM

## 2022-02-04 PROCEDURE — 3074F PR MOST RECENT SYSTOLIC BLOOD PRESSURE < 130 MM HG: ICD-10-PCS | Mod: CPTII,,, | Performed by: STUDENT IN AN ORGANIZED HEALTH CARE EDUCATION/TRAINING PROGRAM

## 2022-03-10 ENCOUNTER — PATIENT MESSAGE (OUTPATIENT)
Dept: RHEUMATOLOGY | Facility: CLINIC | Age: 37
End: 2022-03-10
Payer: MEDICAID

## 2022-06-07 ENCOUNTER — TELEPHONE (OUTPATIENT)
Dept: RHEUMATOLOGY | Facility: CLINIC | Age: 37
End: 2022-06-07
Payer: MEDICAID

## 2022-06-07 NOTE — TELEPHONE ENCOUNTER
Attempted to call patient to reschedule [place] appointment with Dr. Medeiros to a [employer] appt or virtual.

## 2022-12-29 ENCOUNTER — OFFICE VISIT (OUTPATIENT)
Dept: FAMILY MEDICINE CLINIC | Age: 37
End: 2022-12-29
Payer: MEDICAID

## 2022-12-29 VITALS
SYSTOLIC BLOOD PRESSURE: 109 MMHG | DIASTOLIC BLOOD PRESSURE: 72 MMHG | BODY MASS INDEX: 19.46 KG/M2 | HEART RATE: 77 BPM | OXYGEN SATURATION: 100 % | HEIGHT: 64 IN | TEMPERATURE: 98.2 F | WEIGHT: 114 LBS

## 2022-12-29 DIAGNOSIS — M41.9 SCOLIOSIS, UNSPECIFIED SCOLIOSIS TYPE, UNSPECIFIED SPINAL REGION: ICD-10-CM

## 2022-12-29 DIAGNOSIS — Z98.890 HISTORY OF BUNIONECTOMY: Primary | ICD-10-CM

## 2022-12-29 PROCEDURE — 99203 OFFICE O/P NEW LOW 30 MIN: CPT | Performed by: STUDENT IN AN ORGANIZED HEALTH CARE EDUCATION/TRAINING PROGRAM

## 2022-12-29 RX ORDER — LANOLIN ALCOHOL/MO/W.PET/CERES
400 CREAM (GRAM) TOPICAL DAILY
COMMUNITY

## 2022-12-29 RX ORDER — CHOLECALCIFEROL TAB 125 MCG (5000 UNIT) 125 MCG
TAB ORAL DAILY
COMMUNITY

## 2022-12-29 RX ORDER — UBIDECARENONE 30 MG
30 CAPSULE ORAL 3 TIMES DAILY
COMMUNITY

## 2022-12-29 RX ORDER — BISMUTH SUBSALICYLATE 262 MG
1 TABLET,CHEWABLE ORAL DAILY
COMMUNITY

## 2022-12-29 NOTE — PROGRESS NOTES
Identified pt with two pt identifiers. Reviewed record in preparation for visit and have obtained necessary documentation. All patient medications has been reviewed. Chief Complaint   Patient presents with    New Patient    Establish Care    Referral Request     Orthopaedic surgeon. Pt had a surgery in [place] to remove a bunion but that wasn't done correctly. Pt then had to go back to get corrective surgery done on 2/2022. Pt was told to have her staple removed in one year which is approaching in February. Additional information about chief complaint:    Visit Vitals  /72 (BP 1 Location: Left upper arm, BP Patient Position: Sitting, BP Cuff Size: Adult)   Pulse 77   Temp 98.2 °F (36.8 °C) (Temporal)   Ht 5' 4\" (1.626 m)   Wt 114 lb (51.7 kg)   SpO2 100%   BMI 19.57 kg/m²       Health Maintenance Due   Topic    Hepatitis C Screening     COVID-19 Vaccine (1)    DTaP/Tdap/Td series (1 - Tdap)    Flu Vaccine (1)       1. Have you been to the ER, urgent care clinic since your last visit? Hospitalized since your last visit? N/a    2. Have you seen or consulted any other health care providers outside of the [address] [place] [address] since your last visit? Include any pap smears or colon screening.     Cervical leep 2003  Cyst removal right ovary 2004 2016  [facility] 2014

## 2022-12-29 NOTE — PROGRESS NOTES
Assessment/Plan:     Diagnoses and all orders for this visit:    1. History of bunionectomy  -     REFERRAL TO ORTHOPEDICS  -Previous bunionectomy earlier this year that require staple/hardware removal  -Refer to orthopedic surgery, foot and ankle, for further evaluation    2. Scoliosis, unspecified scoliosis type, unspecified spinal region  -     REFERRAL TO ORTHOPEDICS  -Patient endorses previous history of scoliosis  -Previously followed by orthopedic surgery and desires referral today  -Refer to Dr. Liam Campa for orthopedics      Follow-up and Dispositions    Return in about 3 months (around 3/29/2023), or if symptoms worsen or fail to improve, for annual exam.         Discussed expected course/resolution/complications of diagnosis in detail with patient. Medication risks/benefits/costs/interactions/alternatives discussed with patient. Pt expressed understanding with the diagnosis and plan. Subjective:      Matthias Weinstein is a 40 y.o. female who presents for had concerns including New Patient, Establish Care, and Referral Request (Orthopaedic surgeon. Pt had a surgery in [place] to remove a bunion but that wasn't done correctly. Pt then had to go back to get corrective surgery done on 2/2022. Pt was told to have her staple removed in one year which is approaching in February. ). Current Outpatient Medications   Medication Sig Dispense Refill    elderberry fruit (ELDERBERRY PO) Take  by mouth. MAGNESIUM GLUCONATE PO Take  by mouth. PRASTERONE, DHEA, PO Take  by mouth.      coenzyme Q-10 (CO Q-10) 30 mg capsule Take 30 mg by mouth three (3) times daily. cholecalciferol (VITAMIN D3) (5000 Units/125 mcg) tab tablet Take  by mouth daily. folic acid 437 mcg tablet Take 400 mcg by mouth daily. multivitamin (ONE A DAY) tablet Take 1 Tablet by mouth daily.          Allergies   Allergen Reactions    Latex Hives, Itching, Rash and Swelling     Past Medical History:   Diagnosis Date Anemia      Past Surgical History:   Procedure Laterality Date    HX GYN      HX ORTHOPAEDIC       Family History   Problem Relation Age of Onset    Hypertension Father     Diabetes Maternal Grandmother     Diabetes Other      Social History     Socioeconomic History    Marital status: Not on file     Spouse name: Not on file    Number of children: Not on file    Years of education: Not on file    Highest education level: Not on file   Occupational History    Not on file   Tobacco Use    Smoking status: Never    Smokeless tobacco: Never   Vaping Use    Vaping Use: Never used   Substance and Sexual Activity    Alcohol use: Yes     Comment: once a year    Drug use: Never    Sexual activity: Never   Other Topics Concern    Not on file   Social History Narrative    Not on file     Social Determinants of Health     Financial Resource Strain: Not on file   Food Insecurity: Not on file   Transportation Needs: Not on file   Physical Activity: Inactive    Days of Exercise per Week: 0 days    Minutes of Exercise per Session: 0 min   Stress: Not on file   Social Connections: Not on file   Intimate Partner Violence: Not At Risk    Fear of Current or Ex-Partner: No    Emotionally Abused: No    Physically Abused: No    Sexually Abused: No   Housing Stability: Not on file       Patient is a 20-year-old female who presents to the office today to establish care and requesting referral to orthopedics. Patient states that a while back she had a bunionectomy completed on her left medial foot however this did require corrective surgery earlier this year. She states that she was informed that she would need to have a staple removed after 1 year which is upcoming in February. She also states her last cortisone shot was completed in May and is due for repeat therefore requests referral to orthopedic surgery.   She also states she has a history of scoliosis since childhood and would like to establish care with an orthopedic specialist here locally. Of note patient is currently following with GYN and saw them on December 12th. She states she is up-to-date on Pap smears. She also has a history of anemia however recent CBC was normal.  Patient denies any other acute concerns today. ROS:   Review of Systems   Constitutional:  Negative for chills and fever. HENT:  Negative for congestion. Respiratory:  Negative for cough and shortness of breath. Cardiovascular:  Negative for chest pain and leg swelling. Gastrointestinal:  Negative for abdominal pain, nausea and vomiting. Neurological:  Negative for dizziness, tingling, weakness and headaches. Objective:   Visit Vitals  /72 (BP 1 Location: Left upper arm, BP Patient Position: Sitting, BP Cuff Size: Adult)   Pulse 77   Temp 98.2 °F (36.8 °C) (Temporal)   Ht 5' 4\" (1.626 m)   Wt 114 lb (51.7 kg)   LMP 12/12/2022 (Exact Date)   SpO2 100%   BMI 19.57 kg/m²         Vitals and Nurse Documentation reviewed. Physical Exam  Constitutional:       General: She is not in acute distress. Appearance: Normal appearance. She is not toxic-appearing. HENT:      Head: Normocephalic and atraumatic. Eyes:      Conjunctiva/sclera: Conjunctivae normal.   Cardiovascular:      Rate and Rhythm: Normal rate and regular rhythm. Pulses: Normal pulses. Heart sounds: Normal heart sounds. No murmur heard. No gallop. Pulmonary:      Effort: Pulmonary effort is normal. No respiratory distress. Breath sounds: Normal breath sounds. No wheezing or rhonchi. Abdominal:      General: Bowel sounds are normal. There is no distension. Palpations: Abdomen is soft. Tenderness: There is no abdominal tenderness. There is no guarding. Musculoskeletal:      Cervical back: Neck supple. Right lower leg: No edema. Left lower leg: No edema.       Comments: Large scar noted on left foot over the great toe secondary to previous bunionectomy   Neurological:      Mental Status: She is alert and oriented to person, place, and time. Gait: Gait normal.       No results found for this or any previous visit.